# Patient Record
(demographics unavailable — no encounter records)

---

## 2019-10-02 NOTE — CT
CT BRAIN:

 

10/02/2019

 

PROVIDED CLINICAL HISTORY:

Altered mental status.

 

COMPARISON:

None.

 

FINDINGS:

The ventricular system appears normal in size and morphology. There is no evidence for intracranial h
emorrhage or mass effect. The extracranial soft tissues and osseous structures demonstrate an unremar
kable CT appearance.

 

IMPRESSION:

Unremarkable unenhanced CT brain.

 

POS: [facility]

## 2021-02-08 NOTE — PDOC.FPRHP
- History of Present Illness


Chief Complaint: back pain


History of Present Illness: 





Patient is a 79F with PMHx of HTN, HLD, multiple known sacral fx that presents 

with intractable back pain. 


Per patient, she fell while playing in a park around Dawn time and had an 

x-ray done at that time that demonstrated "old fractures" but nothing acute. She

was sent to [facility], from which she was d/c approximately 1 week ago. 

Patient states she is bed-bound because of the pain, and she did not feel that 

she made much progress at rehab at all. She was also recently diagnosed with a 

UTI by her pcp, for which she took "a few days" of abx. She denies any dysuria, 

urinary hesitancy, or urinary urgency at this time. She has had worsening pain 

after leaving rehab, so her pcp told her to come to the ED for further imaging 

and likely admission. 


She reports chronic episodes of bowel and bladder incontinence, but nothing new 

since her fall in December. She endorses occasional numbness in her right foot, 

but no saddle anesthesia. She states the pain is mostly in her right him and it 

radiates down the anterior aspect of her right leg to her foot. She has tried 

hydrocodone and oxycodone without much improvement.


She states she has never been diagnosed with osteoporosis or osteopenia and she 

is up to date on her DEXA scans. 


ED Course: 


650mg tylenol, 50mcg fentanyl x 2, 4mg morphine,8mg zofran, 1L NS








- Allergies/Adverse Reactions


                                    Allergies











Allergy/AdvReac Type Severity Reaction Status Date / Time


 


No Known Drug Allergies Allergy   Verified 02/09/21 00:06














- History


PMHx: HTN, HLD


 


PSHx: hysterectomy, cholecystectomy





FHx: non-contributory


 


Social: no alcohol, tobacco, drug use


 








- Review of Systems


General: denies: fever/chills, weight/appetite/sleep changes


Eyes: denies: eye pain, vision changes


ENT: denies: nasal congestion, rhinorrhea


Respiratory: denies: cough, shortness of breath


Cardiovascular: denies: chest pain, edema


Gastrointestinal: denies: nausea, vomiting, diarrhea, constipation


Genitourinary: denies: incontinence, dysuria, polyuria, discharge


Skin: denies: rashes, lesions


Musculoskeletal: reports: pain, tenderness


Neurological: denies: numbness, syncope


Psychological: reports: depression.  denies: anxiety





- Vital signs


BP: [119/54]  HR: [67] RR: [14] Tmax: [98.8F] Pox: [97]% on [RA]  Wt: [98.88kg] 

  








- Physical Exam


Constitutional: NAD, awake, alert and oriented, well developed


HEENT: normocephalic and atraumatic, EOMI, MMM


Neck: supple, FROM


Chest: no-tender to palpation


Heart: RRR, normal S1/S2, no murmurs/rubs/gallops


Lungs: CTAB, no respiratory distress, good air movement


Abdomen: soft, non-tender


Musculoskeletal: normal structure, normal tone, other (limited mobility of lower

 extremities due to pain, though both have 3+ strength)


Neurological: CN II-XII intact, normal sensation


Skin: no rash/lesions, good turgor


Heme/Lymphatic: no unusual bruising or bleeding, no purpura


Psychiatric: normal mood and affect, good judgment and insight





FMR H&P: Results





- Labs


Result Diagrams: 


                                 02/08/21 18:08





                                 02/08/21 18:08


Lab results: 


                                        











WBC  12.9 thou/uL (4.8-10.8)  H  02/08/21  18:08    


 


Hgb  15.7 g/dL (12.0-16.0)   02/08/21  18:08    


 


Hct  46.5 % (36.0-47.0)   02/08/21  18:08    


 


MCV  91.5 fL (78.0-98.0)   02/08/21  18:08    


 


Plt Count  410 thou/uL (130-400)  H  02/08/21  18:08    


 


Neutrophils %  74.6 % (42.0-75.0)   02/08/21  18:08    


 


Sodium  135 mmol/L (136-145)  L  02/08/21  18:08    


 


Potassium  4.4 mmol/L (3.5-5.1)   02/08/21  18:08    


 


Chloride  98 mmol/L ()   02/08/21  18:08    


 


Carbon Dioxide  25 mmol/L (23-31)   02/08/21  18:08    


 


BUN  39 mg/dL (9.8-20.1)  H  02/08/21  18:08    


 


Creatinine  1.39 mg/dL (0.6-1.1)  H  02/08/21  18:08    


 


Glucose  127 mg/dL ()  H  02/08/21  18:08    


 


Lactic Acid  1.6 mmol/L (0.5-2.2)   02/08/21  18:08    


 


Calcium  9.2 mg/dL (7.8-10.44)   02/08/21  18:08    


 


Total Bilirubin  0.4 mg/dL (0.2-1.2)   02/08/21  18:08    


 


AST  44 U/L (5-34)  H  02/08/21  18:08    


 


ALT  22 U/L (8-55)   02/08/21  18:08    


 


Alkaline Phosphatase  323 U/L ()  H  02/08/21  18:08    


 


Serum Total Protein  8.2 g/dL (5.8-8.1)  H  02/08/21  18:08    


 


Albumin  4.2 g/dL (3.4-4.8)   02/08/21  18:08    


 


Urine Ketones  Negative mg/dL (Negative)   02/08/21  20:14    


 


Urine Blood  Negative  (Negative)   02/08/21  20:14    


 


Urine Nitrite  Negative  (Negative)   02/08/21  20:14    


 


Ur Leukocyte Esterase  25 Florentino/uL (Negative)  A  02/08/21  20:14    


 


Urine RBC  0-3 HPF (0-3)   02/08/21  20:14    


 


Urine WBC  0-3 HPF (0-3)   02/08/21  20:14    


 


Ur Squamous Epith Cells  4-6 HPF (0-3)  A  02/08/21  20:14    


 


Urine Bacteria  2+ HPF (None Seen)  A  02/08/21  20:14    














- Radiology Interpretation


  ** CT scan - pelvis


Status: report reviewed by me (Multiple pelvic fractures. Age indeterminant 

fracture involving the T10 vertebral body, which could represent an acute 

fracture. No vertebral body height loss.)





FMR H&P: A/P





- Problem List


(1) Pelvic fracture


Current Visit: No   Status: Acute   Code(s): S32.9XXA - FRACTURE OF UNSP PARTS 

OF LUMBOSACRAL SPINE AND PELVIS, INIT   





(2) Intractable back pain


Current Visit: No   Status: Acute   Code(s): M54.9 - DORSALGIA, UNSPECIFIED   





(3) Open T10 vertebral fracture


Current Visit: No   Status: Acute   Code(s): S22.079B - UNSP FRACTURE OF T9-T10 

VERTEBRA, INIT FOR OPN FX   





(4) Hypertension


Current Visit: No   Status: Acute   Code(s): I10 - ESSENTIAL (PRIMARY) 

HYPERTENSION   





(5) Hyperlipidemia


Current Visit: No   Status: Acute   Code(s): E78.5 - HYPERLIPIDEMIA, UNSPECIFIED

   





(6) FAHAD (acute kidney injury)


Current Visit: No   Status: Acute   Code(s): N17.9 - ACUTE KIDNEY FAILURE, 

UNSPECIFIED   





- Plan





Patient is a 79F with PMHx of HTN, HLD that is admitted for:





#Intractable pain in context of multiple pelvic fxr and T10 vertebral body fract

ure


-patient has had worsening immobility and pain since her fall in December


-abd/pelvis CT: 


   -Neither hip is dislocated. There is a subacute comminuted and mildly 

displaced fracture involving the inferior pubic ramus on the left with mild 

callus formation. There is no widening of the sacroiliac joints or the pubic 

symphysis. Subtle nondisplaced bilateral sacral ala fractures are noted, L>R.


   -There is an obliquely oriented and comminuted non-displaced anterior medial 

left acetabular fracture with mild associated callus formation


      -ED states they contacted ortho and they recommended consult in am for 

possible hip replacement


      -will make NPO @ midnight for possible procedure


      -will hold anticoagulation for possible procedure


   -There is anterolisthesis at L4-L5 measuring 7mm and at L5-S1 measuring 8-

9mm. There is associated prominent facet hypertrophy and disc space 

narrowing/degenerative endplate change. 


   -There is a linear lucency associated with the T10 vertebral body involving 

anterior osteophytes with probable extension into the region of the inferior 

endplate bilaterally. Acuity of T10 fracture could be best assessed via follow-

up MRI if clinically warranted. 


      -ED contacted neurosurge; Dorita Cantu put in order for TLSO brace; 

appreciate recs


-4mg morphine q4h for pain


-PT/OT consulted





#FAHAD 


-creatinine 1.39, baseline 0.69


-GFR 37, baseline 86


-received 1L NS in ED, will give IV maintenance fluids as patient NPO after 

midnight





#Recent UTI


-patient recently had UTI, reports she had a "few days" of abx tx, but does not 

recall what abx or how long


-UA dirty, though squams present


-patient denies any symptoms of UTI currently


-will hold off on treating pt at this time, can re-consider if patient begins to

 have symptoms





#HTN


-patient's daughter to bring home med list in am





#HLD


-patient's daughter to bring home med list in am





Diet: NPO @ midnight


DVT ppx: SCDs, hold anticoagulation for possible procedure


Dispo: admitted to obs for pain control, TLSO brace per neurosx and ortho 

consult in am


CODE: DNAR, daughter to bring OOHDNAR in am


PCP: [facility]





FMR H&P: Upper Level





- Plan


Date/Time: 02/08/21 6632








I, [], have evaluated this patient and agree with findings/plan as outlined by 

intern resident. Pertinent changes/additions are listed here.








Addendum - Attending





- Attending Attestation


Date/Time: 02/08/21 5471





I personally evaluated the patient and discussed the management with Dr. Isabel.


I agree with the History, Examination, Assessment and Plan documented above with

 any addition or exceptions noted below.





intractable pain 2/2 multiple sacral, hip, and pelvis fractures. Ortho 

consulted. Neurosurg consult for T10 compression fx. pain management. continue 

abx for UTI. IV fluids for mild FAHAD. Trend BMP.

## 2021-02-08 NOTE — CT
CT abdomen and pelvis:

2/8/2021



COMPARISON: None



HISTORY: Pain, history of pelvic fractures following a fall, history of urinary tract infection



TECHNIQUE: Axial CT imaging at 5 mm intervals from lung bases through pubic symphysis with IV contras
t. Coronal and sagittal reformatted imaging obtained.



FINDINGS: The visualized lung bases are unremarkable. No free intraperitoneal air or fluid is seen. T
he uterus appears surgically absent. Cholecystectomy clips are present.



There is a right hepatic cyst centrally measuring 2.1 cm. The spleen, pancreas, adrenal glands, and k
idneys demonstrate no acute findings. There is a tiny hypodense lesion within the medial upper pole

of the right kidney measuring 7 mm, too small to characterize.



There is mild diverticulosis of the sigmoid colon with no evidence for diverticulitis.



No focal area of bowel inflammatory change. No evidence for bowel obstruction. There is scattered ath
erosclerotic calcification of the abdominal aorta and its branches, especially the infrarenal

abdominal aorta.



The abdomen and pelvis demonstrate no evidence for lymphadenopathy.



Neither hip is dislocated. There is a subacute comminuted and mildly displaced fracture involving the
 inferior pubic ramus on the left with mild callus formation. There is no widening of the

sacroiliac joints or the pubic symphysis. Subtle nondisplaced bilateral sacral ala fractures are note
d, left more prominent than right.



There is an obliquely oriented and comminuted nondisplaced anterior medial left acetabular fracture w
ith mild associated callus formation.



There is anterolisthesis at L4-5 measuring 7 mm and at L5-S1 measuring 8-9 mm. There is associated pr
ominent facet hypertrophy and disc space narrowing/degenerative endplate change.



There is a linear lucency associated with the T10 vertebral body involving anterior osteophytes with 
probable extension into the region of the inferior endplate bilaterally. Acuity of T10 fracture

could be best assessed via follow-up MRI if clinically warranted.



IMPRESSION: Multiple pelvic fractures.



Age indeterminant fracture involving the T10 vertebral body. This could represent an acute fracture. 
No vertebral body height loss.



Additional incidental findings as detailed above.



Reported By: Brad Crespo 

Electronically Signed:  2/8/2021 9:09 PM

## 2021-02-08 NOTE — RAD
Frontal radiograph of the pelvis:

2/8/2021



COMPARISON: None



HISTORY: Back pain, sacral pain, fall



FINDINGS: There is a obliquely oriented fracture involving the medial aspect of the inferior pubic ra
mus on the left. There is mild superior joint space narrowing and lateral acetabular osteophyte

formation involving bilateral hips. No widening of the pubic symphysis or the sacroiliac joints.



IMPRESSION: Fracture of the inferior pubic ramus on the left. Follow-up CT examination the pelvis is 
advised for full assessment of pelvic fracture.



Reported By: Brad Crespo 

Electronically Signed:  2/8/2021 6:19 PM

## 2021-02-09 NOTE — PDOC.BPN
- Brief Progress Note


Encounter Date: 02/09/21


Encounter Time: 11:30


I spoke with Melanie Garcia and Dr. Rendon after consulting them on 

this patient. They contacted me stating the fxs seen on CT are well-healed and 

will not require surgical intervention. Since there is nothing to be done from 

an ortho standpoint, the consult was cancelled.

## 2021-02-09 NOTE — PDOC.FM
- Subjective


Subjective: 


Ms. Resendez is doing well this morning. She states her pain is currently well-

controlled and she has no questions or concerns. She continues to deny UTI sxs, 

such as those she was having prior to abx tx in the OP setting.








- Objective


Vital Signs & Weight: 


                             Vital Signs (12 hours)











  Temp Pulse Resp BP Pulse Ox


 


 02/09/21 04:00  97.4 F L  61  18  131/65  93 L


 


 02/09/21 00:05  97.4 F L  64  18  116/56 L  96








                                     Weight











Weight                         95.345 kg














Result Diagrams: 


                                 02/09/21 05:41





                                 02/09/21 05:41





Phys Exam





- Physical Examination


Constitutional: NAD


Neck: supple


Respiratory: clear to auscultation bilateral


Cardiovascular: RRR, no significant murmur


Gastrointestinal: soft, non-tender, no distention, positive bowel sounds


Neurological: non-focal


Psychiatric: normal affect, A&O x 3


Skin: no rash





Dx/Plan





- Plan


Plan: 


Patient is a 79F with PMHx of HTN, HLD that is admitted for:





Intractable pain in context of multiple pelvic fxr and T10 vertebral body 

fracture


-patient has had worsening immobility and pain since her fall in December


-abd/pelvis CT: 


* Neither hip is dislocated. There is a subacute comminuted and mildly displaced

  fracture involving the inferior pubic ramus on the left with mild callus 

  formation. There is no widening of the sacroiliac joints or the pubic 

  symphysis. Subtle nondisplaced bilateral sacral ala fractures are noted, L>R.


* There is an obliquely oriented and comminuted non-displaced anterior medial 

  left acetabular fracture with mild associated callus formation


* There is anterolisthesis at L4-L5 measuring 7mm and at L5-S1 measuring 8-9mm. 

  There is associated prominent facet hypertrophy and disc space 

  narrowing/degenerative endplate change. 


* There is a linear lucency associated with the T10 vertebral body involving 

  anterior osteophytes with probable extension into the region of the inferior 

  endplate bilaterally. Acuity of T10 fracture could be best assessed via 

  follow-up MRI if clinically warranted. 


-ED states they contacted ortho. Will consult officially this am


* NPO @ midnight and will hold anticoagulation for possible procedure


-ED contacted neurosurg; Dorita Cantu put in order for TLSO brace; appreciate 

recs


-morphine for pain


-PT/OT consulted





FAHAD, improving


-creatinine 1.39 > 0.87, baseline 0.69


-GFR 37 > 63, baseline 86


-s/p 1L NS in ED


-mIVF as patient NPO after midnight





Recent UTI


-patient recently had UTI, reports she had a "few days" of abx tx, but does not 

recall what abx or how long


-UA + for bacteria, leuks, various cells, casts


-patient denies any symptoms of UTI currently


-will hold off on treating pt at this time, can re-consider if patient begins to

have symptoms





HTN


-patient's daughter to bring home med list in am





HLD


-patient's daughter to bring home med list in am








Diet: NPO @ midnight


DVT ppx: SCDs, hold anticoagulation for possible procedure


CODE: DNAR, daughter to bring OOHDNAR in am


PCP: [facility]





Dispo: admitted to obs for pain control, TLSO brace per neurosx. Ortho consult 

this am. eLOS >48hrs, pending ortho recs.








Addendum - Attending





- Attending Attestation


Date/Time: 02/09/21 0632





I personally evaluated the patient and discussed the management with Dr. Alberto Dumont. 


I agree with the History, Examination, Assessment and Plan documented above with

any addition or exceptions noted below.

## 2021-02-10 NOTE — PDOC.FM
- Subjective


Subjective: 


Mrs. Resendez is doing well this morning. She was able to work with PT 

yesterday, stating the TLSO brace helped with her pain. She feels that her 

current home pain regimen is controlling her pain well. After discussion with 

her daughter, it was determined her family would prefer she go home instead of 

to rehab, but that they felt it necessary that she have  or private nursing 

available. The pt had no concerns/questions at this time.








- Objective


Vital Signs & Weight: 


                             Vital Signs (12 hours)











  Temp Pulse Resp BP Pulse Ox


 


 02/10/21 08:00  97.7 F  63  15  142/69 H  96


 


 02/10/21 04:00  98.0 F  63  20  147/74 H  95


 


 02/10/21 00:00  98.1 F  68  20  166/86 H  93 L








                                     Weight











Admit Weight                   95.345 kg


 


Weight                         95.345 kg














Result Diagrams: 


                                 02/09/21 05:41





                                 02/09/21 05:41





Phys Exam





- Physical Examination


Constitutional: NAD


Neck: supple


Respiratory: clear to auscultation bilateral


Cardiovascular: RRR, no significant murmur


Neurological: non-focal, moves all 4 limbs


Psychiatric: normal affect, A&O x 3





Dx/Plan





- Plan


Plan: 


Patient is a 79F admitted for intractable pain s/p a fall in December:





Intractable pain in context of multiple pelvic fxr and T10 vertebral body 

fracture


-No surgical intervention, per ortho


-TLSO brace, per neurosurg


-home oxycodone and oxycontin continued. D/c morphine


-PT eval recommends rehab vs HH if pain is controlled. Family is requesting [facility]

[facility] or [facility] nursing. CM consulted to help set this up





FAHAD, improving


-creatinine 1.39 > 0.87, baseline 0.69


-GFR 37 > 63, baseline 86


-mIVF d/c today given adequate PO intake





UTI


-UA + for bacteria, leuks, various cells, casts


-patient denies any symptoms of UTI currently


-will hold off on treating pt at this time, can re-consider if patient begins to

have symptoms





HTN


-patient's daughter to bring home med list this am. continue home meds


-monitor vitals 





HLD


-patient's daughter to bring home med list this am. continue home meds





Anxiety


-patient's daughter to bring home med list this am. continue home meds








Diet: HH-LS


DVT ppx: Padua score of 5. Lovenox


CODE: DNAR, daughter to bring OOHDNAR in am


PCP: [facility]





Dispo: status changed from obs to inpt given LOS >48hrs. D/c pending  set up.








Addendum - Attending





- Attending Attestation


Date/Time: 02/11/21 2065





I personally evaluated the patient and discussed the management with Dr. Alberto Dumont yesterday.


I agree with the History, Examination, Assessment and Plan documented above with

any addition or exceptions noted below.

## 2021-02-10 NOTE — CON
DATE OF CONSULTATION:  02/10/2021



Ms. Resendez is a 79-year-old female who presented status post fall 2 days ago. 
Our

team was consulted by the emergency department provider for recommendations and

review of imaging.  CT of the abdomen and pelvis demonstrated anterior bridging

osteophytes across the lower thoracic vertebrae consistent with an ankylosing

spondylitis variant, as well as a T10 anterior middle column fracture without

vertebral body height loss or retropulsion.  The patient was reportedly

neurologically intact in her lower extremities and complaining of lumbar pain.  
She

reportedly did not have any thoracic pain.  Our team recommended a TLSO 
clamshell

brace when out of bed.  Our team will arrange for outpatient followup with x-
rays

at 2, 6 and 12 weeks following her fall.  No surgical intervention for this 
fracture is 

indicated at this time. 







Job ID:  [id number]



MTDD

## 2021-02-11 NOTE — PDOC.FM
- Subjective


Subjective: 


Mrs. Resendez is doing well this morning. She states her pain is well-controlled

while laying still with the brace on but that working with PT hurts. She is 

aware and agreeable to going home with home health and PT services.








- Objective


Vital Signs & Weight: 


                             Vital Signs (12 hours)











  Temp Pulse Resp BP Pulse Ox


 


 02/11/21 05:39  97.5 F L  60  18  121/67  95


 


 02/11/21 01:15  97.7 F  61  20  108/69  92 L


 


 02/10/21 20:58  97.8 F  73  18  136/72  95








                                     Weight











Admit Weight                   95.345 kg


 


Weight                         95.345 kg














Result Diagrams: 


                                 02/09/21 05:41





                                 02/11/21 07:05





Phys Exam





- Physical Examination


Constitutional: NAD


Neck: supple


Respiratory: clear to auscultation bilateral


Cardiovascular: RRR, no significant murmur


Neurological: non-focal, moves all 4 limbs


Psychiatric: normal affect, A&O x 3





Dx/Plan





- Plan


Plan: 


Patient is a 79F admitted for intractable pain s/p a fall in December:





Intractable pain in context of multiple pelvic fxr and T10 vertebral body 

fracture


-No surgical intervention, per ortho


-TLSO brace, per neurosurg


-home oxycodone and oxycontin controlling pain well


-PT eval recommends rehab vs HH  


-[facility] or private nursing. Both have 

been set up and pt will be d/c today





FAHAD, improving


-baseline Cr 0.69


-baseline GFR 86





UTI


-UA + for bacteria, leuks, various cells, casts


-patient denies any symptoms of UTI currently


-will hold off on treating pt at this time, can re-consider if patient begins to

have symptoms





HTN, resolved


-monitor vitals 





HLD


-continue home meds





Anxiety


-continue home meds








Diet: HH-LS


DVT ppx: Padua score of 5. Lovenox


CODE: DNAR


PCP: [facility]





Dispo: status changed from obs to inpt given LOS >48hrs. Plan for d/c with  

and private nursing today.








Addendum - Attending





- Attending Attestation


Date/Time: 02/12/21 1640





I personally evaluated the patient and discussed the management with Dr. Rik Dumont. 


I agree with the History, Examination, Assessment and Plan documented above with

any addition or exceptions noted below.